# Patient Record
Sex: FEMALE
[De-identification: names, ages, dates, MRNs, and addresses within clinical notes are randomized per-mention and may not be internally consistent; named-entity substitution may affect disease eponyms.]

---

## 2020-04-29 ENCOUNTER — APPOINTMENT (OUTPATIENT)
Dept: EMERGENCY DEPT | Facility: TELEHEALTH | Age: 55
End: 2020-04-29

## 2020-05-20 PROBLEM — Z00.00 ENCOUNTER FOR PREVENTIVE HEALTH EXAMINATION: Status: ACTIVE | Noted: 2020-05-20

## 2020-06-30 NOTE — END OF VISIT
[Time Spent: ___ minutes] : I have spent [unfilled] minutes of time on the encounter. [>50% of the face to face encounter time was spent on counseling and/or coordination of care for ___] : Greater than 50% of the face to face encounter time was spent on counseling and/or coordination of care for [unfilled] [FreeTextEntry3] : All medical entries made by the Scribe were at my, Dr. Mccormack's, direction and personally dictated by me on [5/20/2020]. I have reviewed the chart and agree that the record accurately reflects my personal performance of the history, physical exam, assessment and plan. I have also personally directed, reviewed, and agreed with the chart.

## 2020-06-30 NOTE — REVIEW OF SYSTEMS
[Fatigue] : fatigue [Headache] : headache [Negative] : Psychiatric [de-identified] : lightheadedness

## 2020-06-30 NOTE — PLAN
[FreeTextEntry1] : Patient is a 55 yo female for evaluation of headache today, lightheadedness without headache yesterday. Denies infectious symptoms or any visual/sensorimotor deficits. Symptoms are intermittent, improved with analgesia and rest. BP found to be elevated, during pain crisis. Also reports decreased fluid intake today and increased stress and anxiety.\par Work note was coordinated by [employer] for patient to return to work on 5/2/2020.

## 2020-06-30 NOTE — ADDENDUM
[FreeTextEntry1] : Phone Call began at 7:55 PM and ended at 8:30PM.\cheryl TRAVIS, Yulissa Singh, solely acted as scribe for Dr. Ramírez Mccormack on 5/20/2020.

## 2020-06-30 NOTE — PHYSICAL EXAM
[No Acute Distress] : no acute distress [de-identified] : Speaking in complete sentences, speech not indicative of respiratory distress.  [de-identified] : Normal speech, no slurring or difficulty with word finding.  [de-identified] : Normal speech volume and meter, not pressured. Thought process linear.

## 2020-06-30 NOTE — HISTORY OF PRESENT ILLNESS
[Home] : at home, [unfilled] , at the time of the visit. [Verbal consent obtained from patient] : the patient, [unfilled] [Other Location: e.g. Home (Enter Location, City,State)___] : at [unfilled] [FreeTextEntry8] : 55 yo female with for evaluation of lightheadedness and headache x 2 days. Reports that she had intermittent lightheadedness yesterday that improved with rest, and an intermittent headache today that improved with Tylenol. States she has taken her blood pressure medication as usual this morning. Reports that she ate minimally today - bowl of cereal, cup of tea and homemade soup. Denies high sodium intake. Decreased fluid intake. Headache is described as throbbing, intermittent, waxing and waning, gradual onset. No associated fever, neck stiffness, visual changes, sensorimotor deficits. Reports that headache improve for a duration of time after taking 500 mg Tylenol around 5PM today but then returned. Also reports improvement when lying down. Patient took BP when pain returned and reports elevated BP at around 182/111. Denies any sick contacts. Reports she was at work where symptoms began, works as [employer] - her patient does not have any infectious symptoms. Reports that her headache is improved during this assessment. Patient's weight - 170 lbs. States that she is very stressed due to current events and has been feeling more anxiety especially after watching the current news. Denies suicidal or homicidal ideations. 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